# Patient Record
Sex: FEMALE
[De-identification: names, ages, dates, MRNs, and addresses within clinical notes are randomized per-mention and may not be internally consistent; named-entity substitution may affect disease eponyms.]

---

## 2021-10-01 PROBLEM — Z00.00 ENCOUNTER FOR PREVENTIVE HEALTH EXAMINATION: Status: ACTIVE | Noted: 2021-10-01

## 2021-10-07 ENCOUNTER — APPOINTMENT (OUTPATIENT)
Dept: ORTHOPEDIC SURGERY | Facility: CLINIC | Age: 86
End: 2021-10-07
Payer: MEDICARE

## 2021-10-07 VITALS
WEIGHT: 160 LBS | TEMPERATURE: 97.6 F | HEIGHT: 63 IN | HEART RATE: 93 BPM | DIASTOLIC BLOOD PRESSURE: 80 MMHG | BODY MASS INDEX: 28.35 KG/M2 | OXYGEN SATURATION: 97 % | SYSTOLIC BLOOD PRESSURE: 120 MMHG

## 2021-10-07 DIAGNOSIS — Z78.9 OTHER SPECIFIED HEALTH STATUS: ICD-10-CM

## 2021-10-07 DIAGNOSIS — M32.9 SYSTEMIC LUPUS ERYTHEMATOSUS, UNSPECIFIED: ICD-10-CM

## 2021-10-07 DIAGNOSIS — M17.11 UNILATERAL PRIMARY OSTEOARTHRITIS, RIGHT KNEE: ICD-10-CM

## 2021-10-07 PROCEDURE — 20610 DRAIN/INJ JOINT/BURSA W/O US: CPT | Mod: LT

## 2021-10-07 PROCEDURE — 99203 OFFICE O/P NEW LOW 30 MIN: CPT | Mod: 25

## 2021-10-13 PROBLEM — Z78.9 EXERCISES OCCASIONALLY: Status: ACTIVE | Noted: 2021-10-07

## 2021-10-13 PROBLEM — Z78.9 DRINKS WINE: Status: ACTIVE | Noted: 2021-10-07

## 2021-10-13 PROBLEM — Z78.9 NEVER SMOKED CIGARETTES: Status: ACTIVE | Noted: 2021-10-07

## 2021-10-13 PROBLEM — Z78.9 SOCIAL ALCOHOL USE: Status: ACTIVE | Noted: 2021-10-07

## 2021-10-13 PROBLEM — M17.11 PRIMARY OSTEOARTHRITIS OF RIGHT KNEE: Status: ACTIVE | Noted: 2021-10-13

## 2021-10-13 PROBLEM — M32.9 LUPUS: Status: RESOLVED | Noted: 2021-10-07 | Resolved: 2021-10-13

## 2021-10-13 PROBLEM — Z78.9 NON-SMOKER: Status: ACTIVE | Noted: 2021-10-07

## 2021-10-13 NOTE — HISTORY OF PRESENT ILLNESS
[de-identified] : Annita Lucas is an 87 yo woman with ongoing left knee issues for the last few years. She reports progressive stiffness and swelling in her left knee. She has had progressive limitations in her ADLs and limited ROM. She was seen at [facility] and was diagnosed with DJD and a medial mensicus tear. SHe has tried PT in the past with some improvement. She denies any locking or buckling.

## 2021-10-13 NOTE — PHYSICAL EXAM
[de-identified] : The patient is a well developed, well nourished female in no apparent distress. She is alert and oriented X 3 with a pleasant mood and appropriate affect.\par \par On physical examination of the left knee, her ROM is 0-120 degrees. The patient walks with a normal gait and stands in neutral alignment. There is trace  effusion. No warmth or erythema is noted. The patella is non tender to palpation medially or laterally. There is no crepitus noted. The apprehension and grind tests are negative. The extensor mechanism is intact. There is medial joint line tenderness. The Lindy sign is positive. The Lachman and pivot shift tests are negative. There is no varus or valgus laxity at 0 or 30 degrees. No posterolateral or anteromedial laxity is noted. No masses are palpable. No other soft tissue or bony tenderness is noted. Quadriceps weakness is noted. Neurovascular function is intact. [de-identified] : MRI of the left knee shows complex tearing of the medial meniscus with superimposed DJD

## 2021-10-13 NOTE — DISCUSSION/SUMMARY
[de-identified] : Ms Lucas has symptomatic DJD in her left knee. She received a Gel One injection today. She will slowly increase her activities as tolerated. We will see her back on an as needed basis. SHe may require knee replacement in the future. All questions were answered. She will call if any issues arise,

## 2021-10-13 NOTE — PROCEDURE
[de-identified] : Under strict sterile technique, the left knee was prepped with Betadine. Using the superolateral approach, with the patient supine, a 3mL injection of GelOne was administered intra-articularly. The patient tolerated the procedure well. The patient was instructed to avoid vigorous exercise for 48 hours and will apply ice to the knee for 20 minutes 2-3 times per day if discomfort occurs. Patient will return on an as needed basis. The patient will call if any questions or problems should arise\par \par Gel-One injection - Left knee joint\par Lot #: [id number] Exp: 05-\par Man: Maninder\par NDC: [id number]

## 2021-10-13 NOTE — END OF VISIT
[FreeTextEntry3] : All medical record entries made by LYNDSAY Sousa, acting as a scribe for this encounter under the direction of Je Saldana MD . I have reviewed the chart and agree that the record accurately reflects my personal performance of the history, physical exam, assessment and plan. I have also personally directed, reviewed, and agreed with the chart.